# Patient Record
Sex: FEMALE | Race: WHITE | NOT HISPANIC OR LATINO | URBAN - NONMETROPOLITAN AREA
[De-identification: names, ages, dates, MRNs, and addresses within clinical notes are randomized per-mention and may not be internally consistent; named-entity substitution may affect disease eponyms.]

---
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
## 2022-08-10 ENCOUNTER — OFFICE VISIT (OUTPATIENT)
Dept: URGENT CARE | Facility: PHYSICIAN GROUP | Age: 22
End: 2022-08-10
Payer: MEDICAID

## 2022-08-10 VITALS
RESPIRATION RATE: 16 BRPM | WEIGHT: 164 LBS | BODY MASS INDEX: 30.18 KG/M2 | OXYGEN SATURATION: 96 % | DIASTOLIC BLOOD PRESSURE: 68 MMHG | SYSTOLIC BLOOD PRESSURE: 108 MMHG | TEMPERATURE: 98.2 F | HEART RATE: 86 BPM | HEIGHT: 62 IN

## 2022-08-10 DIAGNOSIS — J02.0 PHARYNGITIS DUE TO STREPTOCOCCUS SPECIES: ICD-10-CM

## 2022-08-10 LAB
INT CON NEG: NEGATIVE
INT CON POS: POSITIVE
S PYO AG THROAT QL: POSITIVE

## 2022-08-10 PROCEDURE — 99203 OFFICE O/P NEW LOW 30 MIN: CPT | Performed by: PHYSICIAN ASSISTANT

## 2022-08-10 PROCEDURE — 87880 STREP A ASSAY W/OPTIC: CPT | Performed by: PHYSICIAN ASSISTANT

## 2022-08-10 RX ORDER — AMOXICILLIN 500 MG/1
500 CAPSULE ORAL 2 TIMES DAILY
Qty: 20 CAPSULE | Refills: 0 | Status: SHIPPED | OUTPATIENT
Start: 2022-08-10 | End: 2022-08-20

## 2022-08-10 ASSESSMENT — ENCOUNTER SYMPTOMS
SINUS PAIN: 0
CHILLS: 1
DIARRHEA: 0
NAUSEA: 0
FEVER: 0
SPUTUM PRODUCTION: 1
BLURRED VISION: 0
COUGH: 1
SORE THROAT: 1
HEADACHES: 0
PALPITATIONS: 0
EYE PAIN: 0
DIZZINESS: 0
VOMITING: 0
MYALGIAS: 1
ABDOMINAL PAIN: 0
SHORTNESS OF BREATH: 0

## 2022-08-10 NOTE — PROGRESS NOTES
Subjective     Torito Keller is a 22 y.o. female who presents with Pharyngitis (X 2 days) and Cough (X 4 days)    HPI:  Torito Keller is a 22 y.o. female who presents today for evaluation of sore throat and cough.  Patient reports symptoms started almost 5 days ago.  Says that the cough has been the most persistent and severe symptom but the sore throat has been gradually worsening and has been really bad the past 2 days as well.  She did have some body aches and chills on Monday but has not had any notable fever.  She has not taken any medications for symptoms.  No sick contacts that she is aware of.  Yesterday she did take an at-home test for COVID-19 virus, which was negative.      Review of Systems   Constitutional:  Positive for chills and malaise/fatigue. Negative for fever.   HENT:  Positive for sore throat. Negative for congestion, ear pain and sinus pain.    Eyes:  Negative for blurred vision and pain.   Respiratory:  Positive for cough and sputum production. Negative for shortness of breath.    Cardiovascular:  Negative for chest pain and palpitations.   Gastrointestinal:  Negative for abdominal pain, diarrhea, nausea and vomiting.   Musculoskeletal:  Positive for myalgias.   Skin:  Negative for rash.   Neurological:  Negative for dizziness and headaches.       PMH:  has no past medical history on file.  MEDS:   Current Outpatient Medications:     amoxicillin (AMOXIL) 500 MG Cap, Take 1 Capsule by mouth 2 times a day for 10 days., Disp: 20 Capsule, Rfl: 0    clindamycin (CLINDAGEL) 1 % Gel, Apply 1 g to affected area(s) 2 times a day. (Patient not taking: Reported on 8/10/2022), Disp: 1 Tube, Rfl: 5    maalox plus-benadryl-xylocaine (MBX), Take 5 mL by mouth every 6 hours as needed. (Patient not taking: Reported on 8/10/2022), Disp: 90 mL, Rfl: 0  ALLERGIES: No Known Allergies  SURGHX: No past surgical history on file.  SOCHX:  reports that she has never smoked. She uses smokeless tobacco. She reports  "current alcohol use. She reports that she does not use drugs.  FH: Family history was reviewed, no pertinent findings to report      Objective     /68   Pulse 86   Temp 36.8 °C (98.2 °F) (Temporal)   Resp 16   Ht 1.575 m (5' 2\")   Wt 74.4 kg (164 lb)   SpO2 96%   BMI 30.00 kg/m²      Physical Exam  Constitutional:       Appearance: She is well-developed.   HENT:      Head: Normocephalic and atraumatic.      Right Ear: Tympanic membrane, ear canal and external ear normal.      Left Ear: Tympanic membrane, ear canal and external ear normal.      Nose: Congestion present. No mucosal edema or rhinorrhea.      Mouth/Throat:      Lips: Pink.      Pharynx: Uvula midline. Posterior oropharyngeal erythema present. No oropharyngeal exudate or uvula swelling.      Tonsils: No tonsillar exudate or tonsillar abscesses. 2+ on the right. 2+ on the left.   Eyes:      Conjunctiva/sclera: Conjunctivae normal.      Pupils: Pupils are equal, round, and reactive to light.   Cardiovascular:      Rate and Rhythm: Normal rate and regular rhythm.      Heart sounds: Normal heart sounds. No murmur heard.  Pulmonary:      Effort: Pulmonary effort is normal.      Breath sounds: Normal breath sounds. No decreased breath sounds, wheezing, rhonchi or rales.   Musculoskeletal:      Cervical back: Normal range of motion.   Lymphadenopathy:      Cervical: Cervical adenopathy present.   Skin:     General: Skin is warm and dry.      Capillary Refill: Capillary refill takes less than 2 seconds.   Neurological:      Mental Status: She is alert and oriented to person, place, and time.   Psychiatric:         Behavior: Behavior normal.         Judgment: Judgment normal.       POCT Rapid Strep A - POSITIVE    Assessment & Plan       1. Pharyngitis due to Streptococcus species  - POCT Rapid Strep A  - amoxicillin (AMOXIL) 500 MG Cap; Take 1 Capsule by mouth 2 times a day for 10 days.  Dispense: 20 Capsule; Refill: 0  -Supportive care discussed to " include salt water gargles, throat lozenges, and increased fluid intake  *Discussed with patient that she is contagious until she has been on the antibiotics for a full 24 hours.  Also recommend that she switch her toothbrush out after being on the antibiotics for 2 to 3 days.        Differential Diagnosis, natural history, and supportive care discussed. Return to the Urgent Care or follow up with your PCP if symptoms fail to resolve, or for any new or worsening symptoms. Emergency room precautions discussed. Patient and/or family appears understanding of information.

## 2022-08-10 NOTE — LETTER
August 10, 2022         Patient: Torito Keller   YOB: 2000   Date of Visit: 8/10/2022           To Whom it May Concern:    Torito Keller was seen in my clinic on 8/10/2022. She may return to work on 8/12/2022.    If you have any questions or concerns, please don't hesitate to call.        Sincerely,           Sarah Flores P.A.-C.  Electronically Signed

## 2024-03-30 ENCOUNTER — APPOINTMENT (OUTPATIENT)
Dept: RADIOLOGY | Facility: IMAGING CENTER | Age: 24
End: 2024-03-30
Payer: MEDICAID

## 2024-03-30 ENCOUNTER — OFFICE VISIT (OUTPATIENT)
Dept: URGENT CARE | Facility: PHYSICIAN GROUP | Age: 24
End: 2024-03-30
Payer: MEDICAID

## 2024-03-30 VITALS
RESPIRATION RATE: 14 BRPM | WEIGHT: 162.5 LBS | BODY MASS INDEX: 29.9 KG/M2 | DIASTOLIC BLOOD PRESSURE: 62 MMHG | HEART RATE: 109 BPM | TEMPERATURE: 98.7 F | OXYGEN SATURATION: 98 % | HEIGHT: 62 IN | SYSTOLIC BLOOD PRESSURE: 110 MMHG

## 2024-03-30 DIAGNOSIS — S69.91XA INJURY OF FINGER OF RIGHT HAND, INITIAL ENCOUNTER: ICD-10-CM

## 2024-03-30 PROCEDURE — 99213 OFFICE O/P EST LOW 20 MIN: CPT

## 2024-03-30 PROCEDURE — 3074F SYST BP LT 130 MM HG: CPT

## 2024-03-30 PROCEDURE — 73140 X-RAY EXAM OF FINGER(S): CPT | Mod: TC,FY,RT | Performed by: RADIOLOGY

## 2024-03-30 PROCEDURE — 3078F DIAST BP <80 MM HG: CPT

## 2024-03-30 NOTE — LETTER
[facility]  [facility] [address] [address]     March 30, 2024    Patient: Torito Keller   YOB: 2000   Date of Visit: 3/30/2024       To Whom It May Concern:    Torito Keller was seen and treated in our department on 3/30/2024.     Please excuse Torito from work 3/30/24.         Sincerely,     RAFAT Mireles.

## 2024-04-01 ASSESSMENT — ENCOUNTER SYMPTOMS
STRIDOR: 0
DIZZINESS: 0
FEVER: 0
NAUSEA: 0
SHORTNESS OF BREATH: 0
VOMITING: 0
WEAKNESS: 0

## 2024-04-02 ENCOUNTER — APPOINTMENT (OUTPATIENT)
Dept: URGENT CARE | Facility: PHYSICIAN GROUP | Age: 24
End: 2024-04-02
Payer: MEDICAID

## 2024-04-02 NOTE — PROGRESS NOTES
"Subjective     Torito Keller is a 24 y.o. female who presents with right ring finger injury x1 day.     HPI:   Torito is a 25yo female presenting for right ring finger injury x1 day. Reports she does not remember how she injured her finger due to consuming alcohol at the time of injury. She has reduced range of motion with limited flexion of the finger. Reports swelling and ecchymosis of digit. Skin intact. Denies purulent drainage from finger. Denies previous injury to digit. Reports intermittent numbness/tingling in distal tip. No fever. Denies sensation loss in digit. No right hand, wrist, forearm, or elbow involvement.      Review of Systems   Constitutional:  Negative for fever.   Respiratory:  Negative for shortness of breath and stridor.    Gastrointestinal:  Negative for nausea and vomiting.   Musculoskeletal:  Positive for joint pain.   Neurological:  Negative for dizziness and weakness.     History reviewed. No pertinent past medical history.     History reviewed. No pertinent surgical history.     Patient has no known allergies.     Social History     Tobacco Use    Smoking status: Never    Smokeless tobacco: Current    Tobacco comments:     vapes   Substance Use Topics    Alcohol use: Yes     Comment: occ    Drug use: No      History reviewed. No pertinent family history.     Medications, Allergies, and current problem list reviewed today in Epic.      Objective     /62   Pulse (!) 109   Temp 37.1 °C (98.7 °F) (Temporal)   Resp 14   Ht 1.575 m (5' 2\")   Wt 73.7 kg (162 lb 8 oz)   SpO2 98%   BMI 29.72 kg/m²      Physical Exam  Vitals reviewed.   Constitutional:       General: She is not in acute distress.  HENT:      Nose: Nose normal.   Eyes:      General: Gaze aligned appropriately.      Extraocular Movements: Extraocular movements intact.      Conjunctiva/sclera: Conjunctivae normal.      Pupils: Pupils are equal, round, and reactive to light.   Cardiovascular:      Rate and Rhythm: " Normal rate.      Pulses: Normal pulses.   Pulmonary:      Effort: Pulmonary effort is normal. No tachypnea, accessory muscle usage, prolonged expiration, respiratory distress or retractions.   Musculoskeletal:      Right forearm: No swelling, edema, deformity, tenderness or bony tenderness.      Left forearm: Normal.      Right wrist: No swelling, deformity, tenderness, bony tenderness, snuff box tenderness or crepitus. Normal range of motion. Normal pulse.      Left wrist: Normal.      Right hand: No swelling, deformity, tenderness or bony tenderness. Normal range of motion. Normal strength. Normal sensation. There is no disruption of two-point discrimination. Normal capillary refill. Normal pulse.      Left hand: Normal.      Cervical back: Full passive range of motion without pain, normal range of motion and neck supple.      Comments: Swelling and ecchymosis to right 4th finger, ecchymosis around proximal interphalangeal joint. Slightly reduced flexion related to pain. ROM of right hand without abnormality. No subungual hematoma. Right wrist and finger extension against resistance intact. Motor, sensory, 2 point discrimination intact distal to injury. No signs of compartment syndrome or abnormality in blood flow to right digits     Skin:     General: Skin is warm and dry.   Neurological:      Mental Status: She is alert. Mental status is at baseline.   Psychiatric:         Mood and Affect: Mood normal.         Behavior: Behavior normal.         Thought Content: Thought content normal.       DX-FINGER(S) 2+ RIGHT    Result Date: 3/30/2024  3/30/2024 3:35 PM HISTORY/REASON FOR EXAM:  Pain/Deformity Following Trauma. Left 4th finger pain, bruising around proximal interphalangeal joint.. TECHNIQUE/EXAM DESCRIPTION AND NUMBER OF VIEWS:  3 views of the RIGHT fingers. COMPARISON: None FINDINGS: There is no fracture. Alignment is normal. No degenerative changes are present.     Negative right 4th/ring finger series       Assessment & Plan     1. Injury of finger of right hand, initial encounter   - DX-FINGER(S) 2+ RIGHT; Future       MDM/Comments:   History and examination consistent with contusion of right 4th finger following trauma. Patient has full functionality of right hand and right digits. Slightly reduced flexion of finger, acute tendon pathology suspicion is low. Imaging of fingers reveals no fracture or dislocation, and osseous structures are in anatomic alignment. Motor, sensory, and neurovascular status without abnormality.    Finger splint applied.      Recommended supportive treatment at home:      Tylenol/ibuprofen for discomfort   Cold compress to finger      Differential diagnosis, natural history, supportive care, and indications for immediate follow-up discussed.       Follow-up as needed if symptoms worsen or fail to improve to PCP, Urgent care or Emergency Room.      Illness progression and alarm symptoms discussed with patient, emphasizing low threshold for returning to clinic/emergency department for worsening symptoms. Patient is agreeable to the plan and verbalizes understanding, and will follow up if warranted.        Patient educated on compartment syndrome as well as poor circulation signs and symptoms. If patient exhibits these signs or symptoms they are to go to the the ER or call emergency services. Patient verbalizes understanding.                       Electronically signed by CHRISTIANO Ward

## 2025-09-05 ENCOUNTER — APPOINTMENT (OUTPATIENT)
Dept: OBGYN | Facility: CLINIC | Age: 25
End: 2025-09-05
Payer: MEDICAID